# Patient Record
Sex: MALE | Race: WHITE | Employment: STUDENT | URBAN - NONMETROPOLITAN AREA
[De-identification: names, ages, dates, MRNs, and addresses within clinical notes are randomized per-mention and may not be internally consistent; named-entity substitution may affect disease eponyms.]

---
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
## 2025-01-23 ENCOUNTER — APPOINTMENT (OUTPATIENT)
Dept: GENERAL RADIOLOGY | Age: 20
End: 2025-01-23
Payer: COMMERCIAL

## 2025-01-23 ENCOUNTER — HOSPITAL ENCOUNTER (EMERGENCY)
Age: 20
Discharge: HOME | End: 2025-01-23
Attending: EMERGENCY MEDICINE
Payer: COMMERCIAL

## 2025-01-23 ENCOUNTER — TELEPHONE (OUTPATIENT)
Age: 20
End: 2025-01-23

## 2025-01-23 ENCOUNTER — TELEPHONE (OUTPATIENT)
Dept: FAMILY MEDICINE CLINIC | Age: 20
End: 2025-01-23

## 2025-01-23 ENCOUNTER — OFFICE VISIT (OUTPATIENT)
Age: 20
End: 2025-01-23

## 2025-01-23 VITALS
OXYGEN SATURATION: 99 % | TEMPERATURE: 98.4 F | RESPIRATION RATE: 18 BRPM | SYSTOLIC BLOOD PRESSURE: 147 MMHG | HEART RATE: 113 BPM | HEIGHT: 70 IN | WEIGHT: 170 LBS | DIASTOLIC BLOOD PRESSURE: 78 MMHG | BODY MASS INDEX: 24.34 KG/M2

## 2025-01-23 VITALS
BODY MASS INDEX: 24.39 KG/M2 | OXYGEN SATURATION: 97 % | WEIGHT: 170 LBS | TEMPERATURE: 101.7 F | SYSTOLIC BLOOD PRESSURE: 150 MMHG | HEART RATE: 98 BPM | DIASTOLIC BLOOD PRESSURE: 78 MMHG | RESPIRATION RATE: 18 BRPM

## 2025-01-23 DIAGNOSIS — R94.31 ABNORMAL ECG: Primary | ICD-10-CM

## 2025-01-23 DIAGNOSIS — R05.8 PRODUCTIVE COUGH: Primary | ICD-10-CM

## 2025-01-23 DIAGNOSIS — R03.0 ELEVATED BLOOD PRESSURE READING IN OFFICE WITHOUT DIAGNOSIS OF HYPERTENSION: ICD-10-CM

## 2025-01-23 DIAGNOSIS — R50.9 FEVER, UNSPECIFIED FEVER CAUSE: ICD-10-CM

## 2025-01-23 LAB
ANION GAP SERPL CALC-SCNC: 14 MEQ/L (ref 8–16)
BASOPHILS ABSOLUTE: 0 THOU/MM3 (ref 0–0.1)
BASOPHILS NFR BLD AUTO: 0.2 %
BUN SERPL-MCNC: 12 MG/DL (ref 7–22)
CALCIUM SERPL-MCNC: 9.4 MG/DL (ref 8.5–10.5)
CHLORIDE SERPL-SCNC: 98 MEQ/L (ref 98–111)
CO2 SERPL-SCNC: 23 MEQ/L (ref 23–33)
CREAT SERPL-MCNC: 0.7 MG/DL (ref 0.4–1.2)
CRP SERPL-MCNC: 1.89 MG/DL (ref 0–1)
DEPRECATED RDW RBC AUTO: 41.8 FL (ref 35–45)
EKG ATRIAL RATE: 100 BPM
EKG ATRIAL RATE: 103 BPM
EKG P AXIS: 57 DEGREES
EKG P AXIS: 58 DEGREES
EKG P-R INTERVAL: 100 MS
EKG P-R INTERVAL: 164 MS
EKG Q-T INTERVAL: 332 MS
EKG Q-T INTERVAL: 414 MS
EKG QRS DURATION: 72 MS
EKG QRS DURATION: 88 MS
EKG QTC CALCULATION (BAZETT): 434 MS
EKG QTC CALCULATION (BAZETT): 534 MS
EKG R AXIS: 24 DEGREES
EKG R AXIS: 79 DEGREES
EKG T AXIS: -50 DEGREES
EKG T AXIS: 109 DEGREES
EKG VENTRICULAR RATE: 100 BPM
EKG VENTRICULAR RATE: 103 BPM
EOSINOPHIL NFR BLD AUTO: 0 %
EOSINOPHILS ABSOLUTE: 0 THOU/MM3 (ref 0–0.4)
ERYTHROCYTE [DISTWIDTH] IN BLOOD BY AUTOMATED COUNT: 12.2 % (ref 11.5–14.5)
ERYTHROCYTE [SEDIMENTATION RATE] IN BLOOD BY WESTERGREN METHOD: 2 MM/HR (ref 0–10)
GFR SERPL CREATININE-BSD FRML MDRD: > 90 ML/MIN/1.73M2
GLUCOSE SERPL-MCNC: 95 MG/DL (ref 70–108)
HCT VFR BLD AUTO: 43.3 % (ref 42–52)
HETEROPHILE ANTIBODIES: NEGATIVE
HGB BLD-MCNC: 15.1 GM/DL (ref 14–18)
IMM GRANULOCYTES # BLD AUTO: 0.06 THOU/MM3 (ref 0–0.07)
IMM GRANULOCYTES NFR BLD AUTO: 0.6 %
INFLUENZA VIRUS A RNA: NEGATIVE
INFLUENZA VIRUS B RNA: NEGATIVE
LYMPHOCYTES ABSOLUTE: 1 THOU/MM3 (ref 1–4.8)
LYMPHOCYTES NFR BLD AUTO: 9.5 %
Lab: NORMAL
MCH RBC QN AUTO: 32.6 PG (ref 26–33)
MCHC RBC AUTO-ENTMCNC: 34.9 GM/DL (ref 32.2–35.5)
MCV RBC AUTO: 93.5 FL (ref 80–94)
MONOCYTES ABSOLUTE: 1.4 THOU/MM3 (ref 0.4–1.3)
MONOCYTES NFR BLD AUTO: 13.5 %
NEUTROPHILS ABSOLUTE: 7.9 THOU/MM3 (ref 1.8–7.7)
NEUTROPHILS NFR BLD AUTO: 76.2 %
NRBC BLD AUTO-RTO: 0 /100 WBC
OSMOLALITY SERPL CALC.SUM OF ELEC: 269.7 MOSMOL/KG (ref 275–300)
PLATELET # BLD AUTO: 261 THOU/MM3 (ref 130–400)
PMV BLD AUTO: 10.5 FL (ref 9.4–12.4)
POTASSIUM SERPL-SCNC: 3.6 MEQ/L (ref 3.5–5.2)
QC PASS/FAIL: NORMAL
RBC # BLD AUTO: 4.63 MILL/MM3 (ref 4.7–6.1)
SARS-COV-2 RDRP RESP QL NAA+PROBE: NEGATIVE
SODIUM SERPL-SCNC: 135 MEQ/L (ref 135–145)
TROPONIN, HIGH SENSITIVITY: 7 NG/L (ref 0–12)
WBC # BLD AUTO: 10.4 THOU/MM3 (ref 4.8–10.8)

## 2025-01-23 PROCEDURE — 99285 EMERGENCY DEPT VISIT HI MDM: CPT

## 2025-01-23 PROCEDURE — 93005 ELECTROCARDIOGRAM TRACING: CPT

## 2025-01-23 PROCEDURE — 71045 X-RAY EXAM CHEST 1 VIEW: CPT

## 2025-01-23 PROCEDURE — 80048 BASIC METABOLIC PNL TOTAL CA: CPT

## 2025-01-23 PROCEDURE — 6370000000 HC RX 637 (ALT 250 FOR IP)

## 2025-01-23 PROCEDURE — 99213 OFFICE O/P EST LOW 20 MIN: CPT | Performed by: NURSE PRACTITIONER

## 2025-01-23 PROCEDURE — 86140 C-REACTIVE PROTEIN: CPT

## 2025-01-23 PROCEDURE — 85025 COMPLETE CBC W/AUTO DIFF WBC: CPT

## 2025-01-23 PROCEDURE — 84484 ASSAY OF TROPONIN QUANT: CPT

## 2025-01-23 PROCEDURE — 87635 SARS-COV-2 COVID-19 AMP PRB: CPT | Performed by: NURSE PRACTITIONER

## 2025-01-23 PROCEDURE — 86308 HETEROPHILE ANTIBODY SCREEN: CPT | Performed by: NURSE PRACTITIONER

## 2025-01-23 PROCEDURE — 2580000003 HC RX 258

## 2025-01-23 PROCEDURE — 85651 RBC SED RATE NONAUTOMATED: CPT

## 2025-01-23 PROCEDURE — 36415 COLL VENOUS BLD VENIPUNCTURE: CPT

## 2025-01-23 PROCEDURE — 87502 INFLUENZA DNA AMP PROBE: CPT | Performed by: NURSE PRACTITIONER

## 2025-01-23 RX ORDER — CETIRIZINE HYDROCHLORIDE 10 MG/1
10 TABLET ORAL DAILY
COMMUNITY
Start: 2025-01-04 | End: 2025-02-03

## 2025-01-23 RX ORDER — ACETAMINOPHEN 500 MG
1000 TABLET ORAL
Status: COMPLETED | OUTPATIENT
Start: 2025-01-23 | End: 2025-01-23

## 2025-01-23 RX ORDER — ACETAMINOPHEN 325 MG/1
650 TABLET ORAL ONCE
Status: COMPLETED | OUTPATIENT
Start: 2025-01-23 | End: 2025-01-23

## 2025-01-23 RX ORDER — TRETINOIN 0.5 MG/G
CREAM TOPICAL
COMMUNITY
Start: 2024-11-25

## 2025-01-23 RX ORDER — 0.9 % SODIUM CHLORIDE 0.9 %
1000 INTRAVENOUS SOLUTION INTRAVENOUS ONCE
Status: COMPLETED | OUTPATIENT
Start: 2025-01-23 | End: 2025-01-23

## 2025-01-23 RX ORDER — DOXYCYCLINE HYCLATE 100 MG
100 TABLET ORAL EVERY 12 HOURS
Qty: 14 TABLET | Refills: 0 | Status: SHIPPED | OUTPATIENT
Start: 2025-01-23 | End: 2025-01-30

## 2025-01-23 RX ADMIN — SODIUM CHLORIDE 1000 ML: 9 INJECTION, SOLUTION INTRAVENOUS at 17:48

## 2025-01-23 RX ADMIN — ACETAMINOPHEN 1000 MG: 500 TABLET ORAL at 17:48

## 2025-01-23 RX ADMIN — ACETAMINOPHEN 650 MG: 325 TABLET ORAL at 12:32

## 2025-01-23 SDOH — ECONOMIC STABILITY: FOOD INSECURITY: WITHIN THE PAST 12 MONTHS, THE FOOD YOU BOUGHT JUST DIDN'T LAST AND YOU DIDN'T HAVE MONEY TO GET MORE.: NEVER TRUE

## 2025-01-23 SDOH — ECONOMIC STABILITY: FOOD INSECURITY: WITHIN THE PAST 12 MONTHS, YOU WORRIED THAT YOUR FOOD WOULD RUN OUT BEFORE YOU GOT MONEY TO BUY MORE.: NEVER TRUE

## 2025-01-23 ASSESSMENT — ENCOUNTER SYMPTOMS
VOMITING: 0
SORE THROAT: 1
COUGH: 1
WHEEZING: 0
NAUSEA: 0
ABDOMINAL PAIN: 0
CHEST TIGHTNESS: 0
SHORTNESS OF BREATH: 0
DIARRHEA: 0
BACK PAIN: 0

## 2025-01-23 ASSESSMENT — PATIENT HEALTH QUESTIONNAIRE - PHQ9
1. LITTLE INTEREST OR PLEASURE IN DOING THINGS: NOT AT ALL
SUM OF ALL RESPONSES TO PHQ QUESTIONS 1-9: 0
SUM OF ALL RESPONSES TO PHQ QUESTIONS 1-9: 0
2. FEELING DOWN, DEPRESSED OR HOPELESS: NOT AT ALL
SUM OF ALL RESPONSES TO PHQ QUESTIONS 1-9: 0
SUM OF ALL RESPONSES TO PHQ9 QUESTIONS 1 & 2: 0
SUM OF ALL RESPONSES TO PHQ QUESTIONS 1-9: 0

## 2025-01-23 NOTE — ED PROVIDER NOTES
[facility] EMERGENCY DEPARTMENT - VISIT NOTE    Patient Name: Prem Osman  MRN: [id number]  YOB: 2005  Date of Evaluation: 1/23/2025  Treating Resident Physician: Addison Hanson MD  Supervising Physician: Flex Johnson DO    CHIEF COMPLAINT       Chief Complaint   Patient presents with    Fever    Abnormal EKG       HISTORY OF PRESENT ILLNESS    HPI    History obtained from mother and the patient.    Prem is a 19 y.o. old male who presents to the emergency department by Walk In for evaluation of ongoing upper respiratory viral symptoms, abnormal EKG.  Patient is a previously healthy 19-year-old  presents with 2 weeks of ongoing symptoms of sore throat, cough, today low-grade fevers.  He presented to his urgent care on campus again today where they did an EKG which was reportedly abnormal and they wanted patient sent here for further evaluation.  Here, patient is tachycardic but otherwise well-appearing.  No chest pain, shortness of breath, headache, dizziness, back pain, abdominal pain.    Chart reviewed, relevant history summarized in HPI above.      REVIEW OF SYSTEMS   Review of Systems  Negative unless documented in HPI    PAST MEDICAL AND SURGICAL HISTORY   Prem  has a past medical history of Gastroesophageal reflux disease (09/09/2014) and Heart murmur.    Prem  has no past surgical history on file.    CURRENT MEDICATIONS   Prem is not on any long-term medications.    ALLERGIES   Prem has No Known Allergies.    FAMILY HISTORY   Prem family history is not on file.    SOCIAL HISTORY   Prem  reports that he has never smoked. He has never used smokeless tobacco. He reports that he does not use drugs.    PHYSICAL EXAM     ED Triage Vitals [01/23/25 1533]   BP Systolic BP Percentile Diastolic BP Percentile Temp Temp Source Pulse Respirations SpO2   (!) 147/78 -- -- 98.4 °F (36.9 °C) Oral (!) 113 18 99 %      Height Weight - Scale         1.778 m (5' 10\") 77.1 kg (170 lb)

## 2025-01-23 NOTE — PROGRESS NOTES
[facility] [place] [facility]  [facility]  [address] [facility] [address]  Dept: [phone]  Loc: [phone]     Prem Osman is a 19 y.o. male who presents today for:  Chief Complaint   Patient presents with    Other     Sore throat, headache, fever. Started a few days after chris. Got worse last night.     Congestion    Cough       Assessment/Plan:     1. Fever, unspecified fever cause  -flu, covid, and mono neg.  He declines strep test.  Discussed that since fever started less than 24 hours ago, flu test could be a false neg.  Advised that he return to clinic for repeat test tomorrow.  Tylenol was administered in clinic, he has not taken an antipyretic yet today.  No neck stiffness noted on exam- neg mary and brudzinski.  He is in no acute distress.  Respirations are unlabored, lungs CTA.  -instructed pt to seek higher level of care if temp >103F or if he develops any new sxs such as dizziness, lightheadedness, chest pain, heart palpitations, trouble breathing, SOB, wheezing, or neck stiffness.  He verbalized understanding.  - POCT Influenza A/B DNA (Alere i)  - POCT COVID-19 Rapid, NAAT  - POCT Infectious Mononucleosis Abs (mono)  - acetaminophen (TYLENOL) tablet 650 mg    2.  Productive cough  -since all testing was neg, will start on doxycycline d/t persistent sxs x1 month with new onset fever along with persistent productive cough.  If flu test is pos tomorrow, will instruct pt to stop antibiotic.  He took cefdinir a few weeks ago, no other recent antibiotics.    3. Elevated blood pressure reading in office without diagnosis of hypertension  -pt's BP is elevated- he states this is normal when he's at a doctor's office.  He denies chest pain, heart palpitations, SOB, wheezing, dizziness, lightheadedness and syncope.  May also be r/t illness.  Will recheck tomorrow but instructed pt to go to ED if he develops any of these sxs       Results for orders

## 2025-01-23 NOTE — TELEPHONE ENCOUNTER
URI symptoms and tiredness for a few weeks.  -seen at [facility] by Maricarmen today.  Negative for covid, flu and mono.  I spoke with Maricarmen earlier regarding him.  -EKG today with sinus tachycardia and t wave inversions in lateral and inferior leads.  ST depression in lead v5-6.  -Concern for myocarditis.  Need to rule out pericardial effusion given his fevers.  Needs labs, troponin, etc.    -I spoke with Anthony the ATC at [facility] and advised the patient to go to the ED.  Patient going to ED by private vehicle  -I called and informed triage nurse at [facility]

## 2025-01-23 NOTE — ED TRIAGE NOTES
Pt presents to the ED with c/o a fever and an abnormal EKG. Pt states he got sick 2 days after [place] and was prescribed steroids which helped a little bit. Pt states he woke up today felling hot and was checked out by his team doctor at [facility] and was told to come to the Ed for possible myocarditis. EKG completed. Pt states he was swabbed for covid/flu this morning which were negative. Pt states he was tested for mono today as well and was also negative.

## 2025-01-23 NOTE — TELEPHONE ENCOUNTER
Received call from CHINTAN Kang MD that pt had EKG performed by the athletic trainers and results were abnormal.  Pt is being sent to a higher level of care for further evaluation.    Spoke to Anthony, AT who states patient and parents have been notified and pt's friend will drive him to the ED within the next hour.

## 2025-01-24 ENCOUNTER — HOSPITAL ENCOUNTER (OUTPATIENT)
Age: 20
Discharge: HOME | End: 2025-01-26
Attending: INTERNAL MEDICINE
Payer: COMMERCIAL

## 2025-01-24 ENCOUNTER — TELEPHONE (OUTPATIENT)
Dept: CARDIOLOGY CLINIC | Age: 20
End: 2025-01-24

## 2025-01-24 ENCOUNTER — OFFICE VISIT (OUTPATIENT)
Dept: CARDIOLOGY CLINIC | Age: 20
End: 2025-01-24
Payer: COMMERCIAL

## 2025-01-24 VITALS
SYSTOLIC BLOOD PRESSURE: 158 MMHG | DIASTOLIC BLOOD PRESSURE: 82 MMHG | HEART RATE: 89 BPM | HEIGHT: 70 IN | WEIGHT: 171.8 LBS | BODY MASS INDEX: 24.6 KG/M2

## 2025-01-24 DIAGNOSIS — R01.1 HEART MURMUR: ICD-10-CM

## 2025-01-24 DIAGNOSIS — R94.31 ABNORMAL EKG: Primary | ICD-10-CM

## 2025-01-24 LAB
ECHO AO ASC DIAM: 2.8 CM
ECHO AO ASCENDING AORTA INDEX: 1.43 CM/M2
ECHO AV CUSP MM: 2.1 CM
ECHO AV MEAN GRADIENT: 9 MMHG
ECHO AV MEAN VELOCITY: 1.4 M/S
ECHO AV PEAK GRADIENT: 16 MMHG
ECHO AV PEAK VELOCITY: 2 M/S
ECHO AV VELOCITY RATIO: 1
ECHO AV VTI: 34.8 CM
ECHO BSA: 1.96 M2
ECHO EST RA PRESSURE: 3 MMHG
ECHO LA AREA 2C: 19.3 CM2
ECHO LA AREA 4C: 17.1 CM2
ECHO LA DIAMETER INDEX: 2.09 CM/M2
ECHO LA DIAMETER: 4.1 CM
ECHO LA MAJOR AXIS: 5.7 CM
ECHO LA MINOR AXIS: 5.3 CM
ECHO LA VOL BP: 51 ML (ref 18–58)
ECHO LA VOL MOD A2C: 58 ML (ref 18–58)
ECHO LA VOL MOD A4C: 41 ML (ref 18–58)
ECHO LA VOL/BSA BIPLANE: 26 ML/M2 (ref 16–34)
ECHO LA VOLUME INDEX MOD A2C: 30 ML/M2 (ref 16–34)
ECHO LA VOLUME INDEX MOD A4C: 21 ML/M2 (ref 16–34)
ECHO LV E' LATERAL VELOCITY: 11.4 CM/S
ECHO LV E' SEPTAL VELOCITY: 10.6 CM/S
ECHO LV EDV A2C: 112 ML
ECHO LV EDV A4C: 108 ML
ECHO LV EDV INDEX A4C: 55 ML/M2
ECHO LV EDV NDEX A2C: 57 ML/M2
ECHO LV EJECTION FRACTION A2C: 65 %
ECHO LV EJECTION FRACTION A4C: 63 %
ECHO LV EJECTION FRACTION BIPLANE: 64 % (ref 55–100)
ECHO LV ESV A2C: 40 ML
ECHO LV ESV A4C: 39 ML
ECHO LV ESV INDEX A2C: 20 ML/M2
ECHO LV ESV INDEX A4C: 20 ML/M2
ECHO LV FRACTIONAL SHORTENING: 35 % (ref 28–44)
ECHO LV INTERNAL DIMENSION DIASTOLE INDEX: 2.45 CM/M2
ECHO LV INTERNAL DIMENSION DIASTOLIC: 4.8 CM (ref 4.2–5.9)
ECHO LV INTERNAL DIMENSION SYSTOLIC INDEX: 1.58 CM/M2
ECHO LV INTERNAL DIMENSION SYSTOLIC: 3.1 CM
ECHO LV ISOVOLUMETRIC RELAXATION TIME (IVRT): 46 MS
ECHO LV IVSD: 1.1 CM (ref 0.6–1)
ECHO LV MASS 2D: 194 G (ref 88–224)
ECHO LV MASS INDEX 2D: 99 G/M2 (ref 49–115)
ECHO LV POSTERIOR WALL DIASTOLIC: 1.1 CM (ref 0.6–1)
ECHO LV RELATIVE WALL THICKNESS RATIO: 0.46
ECHO LVOT AV VTI INDEX: 0.93
ECHO LVOT MEAN GRADIENT: 8 MMHG
ECHO LVOT PEAK GRADIENT: 16 MMHG
ECHO LVOT PEAK VELOCITY: 2 M/S
ECHO LVOT VTI: 32.5 CM
ECHO MV A VELOCITY: 0.66 M/S
ECHO MV E DECELERATION TIME (DT): 186 MS
ECHO MV E VELOCITY: 1.1 M/S
ECHO MV E/A RATIO: 1.67
ECHO MV E/E' LATERAL: 9.65
ECHO MV E/E' RATIO (AVERAGED): 10.01
ECHO MV E/E' SEPTAL: 10.38
ECHO MV REGURGITANT PEAK GRADIENT: 85 MMHG
ECHO MV REGURGITANT PEAK VELOCITY: 4.6 M/S
ECHO PV MAX VELOCITY: 1.4 M/S
ECHO PV PEAK GRADIENT: 8 MMHG
ECHO RIGHT VENTRICULAR SYSTOLIC PRESSURE (RVSP): 19 MMHG
ECHO RV INTERNAL DIMENSION: 3.1 CM
ECHO RV TAPSE: 2.6 CM (ref 1.7–?)
ECHO TV E WAVE: 0.9 M/S
ECHO TV REGURGITANT MAX VELOCITY: 1.98 M/S
ECHO TV REGURGITANT PEAK GRADIENT: 16 MMHG

## 2025-01-24 PROCEDURE — 93306 TTE W/DOPPLER COMPLETE: CPT | Performed by: INTERNAL MEDICINE

## 2025-01-24 PROCEDURE — 93306 TTE W/DOPPLER COMPLETE: CPT

## 2025-01-24 PROCEDURE — 99204 OFFICE O/P NEW MOD 45 MIN: CPT | Performed by: INTERNAL MEDICINE

## 2025-01-24 NOTE — PROGRESS NOTES
[facility] [place] [facility]  [facility]  [address]  [address]  [facility] [address]  Dept: [phone]  Dept Fax: [phone]  Loc: [phone]    Visit Date: 1/24/2025  Mr. Osman is a 19 y.o. male  who presented for:   Chief Complaint   Patient presents with    Follow-up        HPI:   18 yo M is referred for evaluation. Has been dealing with viral illness since Christmas.  Has been having febrile episodes as recent as yesterday.  Had 101.7F.  He is referred here for abnorma EKG with high voltage and TWI. Denies any presyncope or syncopral episodes.  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  No family hx of SCD.        Current Outpatient Medications:     cetirizine (ZYRTEC) 10 MG tablet, Take 1 tablet by mouth daily, Disp: , Rfl:     tretinoin (RETIN-A) 0.05 % cream, , Disp: , Rfl:     doxycycline hyclate (VIBRA-TABS) 100 MG tablet, Take 1 tablet by mouth in the morning and 1 tablet in the evening. Do all this for 7 days., Disp: 14 tablet, Rfl: 0    Past Medical History   has a past medical history of Gastroesophageal reflux disease and Heart murmur.    Social History  Prem  reports that he has never smoked. He has never used smokeless tobacco. He reports that he does not use drugs.    Family History  Prem family history is not on file.    Past Surgical History   No past surgical history on file.    Subjective:     REVIEW OF SYSTEMS  Constitutional: denies sweats, chills and fever  HENT: denies  congestion, sinus pressure, sneezing and sore throat.    Eyes: denies  pain, discharge, redness and itching.   Respiratory: denies apnea, cough  Gastrointestinal: denies blood in stool, constipation, diarrhea   Endocrine: denies cold intolerance, heat intolerance, polydipsia.  Genitourinary: denies dysuria, enuresis, flank pain and hematuria.   Musculoskeletal: denies arthralgias, joint swelling and neck pain.   Neurological: denies numbness and headaches.   Psychiatric/Behavioral: denies

## 2025-01-24 NOTE — TELEPHONE ENCOUNTER
Pt was seen by Dr. Dorman 1-24-25.  Echo was ordered.    Dr. Kang are you still needing Dr. Dorman to call you?

## 2025-01-24 NOTE — TELEPHONE ENCOUNTER
Pt came up to desk after appt, and stated that Dr. Zhang Kang at [facility] wanted Dr. Dorman to give him a call after the appt to talk with him about this pt.

## 2025-01-24 NOTE — PATIENT INSTRUCTIONS
Your Provider for Today: Dr. Dorman  Your nurses for today: Vasyl    You may receive a survey regarding the care you received during your visit.  Your input is valuable to us.  We encourage you to complete and return your survey.  We hope you will choose us in the future for your healthcare needs.

## 2025-01-24 NOTE — PROGRESS NOTES
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  His team doctor did an EKG.  Abnormal.  They sent him to ER.     He has not started the ATB

## 2025-01-27 ENCOUNTER — TELEPHONE (OUTPATIENT)
Dept: CARDIOLOGY CLINIC | Age: 20
End: 2025-01-27

## 2025-01-27 DIAGNOSIS — R94.31 ABNORMAL EKG: ICD-10-CM

## 2025-01-27 DIAGNOSIS — R01.1 HEART MURMUR: Primary | ICD-10-CM

## 2025-01-27 NOTE — TELEPHONE ENCOUNTER
Referral to [facility] cancelled, just needs to be a Cardiac MRI order to be done at [facility].  MRI order placed and brought to scheduling.

## 2025-01-27 NOTE — TELEPHONE ENCOUNTER
----- Message from Dr. Santy Dorman MD sent at 1/24/2025  4:01 PM EST -----  His Echo is unremarkable.   Please let patient know.     Refer to [facility] for cardiac MRI due to abnormal EKG. Thanks

## 2025-01-29 ENCOUNTER — TELEPHONE (OUTPATIENT)
Dept: FAMILY MEDICINE CLINIC | Age: 20
End: 2025-01-29

## 2025-01-29 NOTE — TELEPHONE ENCOUNTER
Spoke with patient at [facility] on 1/28 in the afternoon.  Patient gave verbal permission for me to discuss his abnormal EKG and associated testing with his father.  Father called me last night at about 6:15 PM and we discussed plan of care, abnormal EKG, etc. all questions answered

## 2025-02-10 ENCOUNTER — TELEPHONE (OUTPATIENT)
Dept: CARDIOLOGY CLINIC | Age: 20
End: 2025-02-10

## 2025-02-10 DIAGNOSIS — R01.1 HEART MURMUR: Primary | ICD-10-CM

## 2025-02-10 DIAGNOSIS — R94.31 ABNORMAL EKG: ICD-10-CM

## 2025-02-10 NOTE — TELEPHONE ENCOUNTER
Spoke to Macario on this patient.  Macario spoke to Dr Kang.   Dr Kang to call the patient.  Pt scheduled tomorrow with Macario, can push this appointment out after 6 month cardiac MRI.  Pt notified.   Cardiac MRI ordered and brought to scheduling.  Appt tomorrow cancelled.   Will need appt after MRI.

## 2025-02-10 NOTE — TELEPHONE ENCOUNTER
Patient's cardiac MRI was reviewed by myself and Dr. Dorman.  Patient is allowed to resume normal activity.  Recommend a repeat cardiac MRI in 6 months.  If there are any irregularities on that MRI recommend a referral to [facility].

## 2025-08-11 DIAGNOSIS — R01.1 HEART MURMUR: ICD-10-CM

## 2025-08-11 DIAGNOSIS — R94.31 ABNORMAL EKG: ICD-10-CM
